# Patient Record
Sex: FEMALE | Race: BLACK OR AFRICAN AMERICAN | Employment: FULL TIME | URBAN - METROPOLITAN AREA
[De-identification: names, ages, dates, MRNs, and addresses within clinical notes are randomized per-mention and may not be internally consistent; named-entity substitution may affect disease eponyms.]

---

## 2017-12-01 PROBLEM — R06.00 DYSPNEA: Status: ACTIVE | Noted: 2017-12-01

## 2017-12-01 PROBLEM — I51.7 CARDIOMEGALY: Status: ACTIVE | Noted: 2017-12-01

## 2017-12-03 PROBLEM — I50.9 CHF (CONGESTIVE HEART FAILURE) (HCC): Status: ACTIVE | Noted: 2017-12-03

## 2017-12-03 PROBLEM — J18.9 PNEUMONIA: Status: ACTIVE | Noted: 2017-12-03

## 2018-01-08 ENCOUNTER — OFFICE VISIT (OUTPATIENT)
Dept: FAMILY MEDICINE CLINIC | Age: 58
End: 2018-01-08

## 2018-01-08 VITALS
OXYGEN SATURATION: 98 % | SYSTOLIC BLOOD PRESSURE: 138 MMHG | BODY MASS INDEX: 43.95 KG/M2 | RESPIRATION RATE: 14 BRPM | WEIGHT: 290 LBS | TEMPERATURE: 97.9 F | HEIGHT: 68 IN | DIASTOLIC BLOOD PRESSURE: 78 MMHG | HEART RATE: 68 BPM

## 2018-01-08 DIAGNOSIS — E66.01 OBESITY, MORBID (HCC): ICD-10-CM

## 2018-01-08 DIAGNOSIS — Z91.199 MEDICAL NON-COMPLIANCE: ICD-10-CM

## 2018-01-08 DIAGNOSIS — R79.89 ELEVATED SERUM CREATININE: ICD-10-CM

## 2018-01-08 DIAGNOSIS — J18.9 PNEUMONIA OF LEFT LOWER LOBE DUE TO INFECTIOUS ORGANISM: ICD-10-CM

## 2018-01-08 DIAGNOSIS — I50.40 COMBINED SYSTOLIC AND DIASTOLIC CONGESTIVE HEART FAILURE, UNSPECIFIED CONGESTIVE HEART FAILURE CHRONICITY: Primary | ICD-10-CM

## 2018-01-08 NOTE — PROGRESS NOTES
Patient is here to establish care with new pcp. .1. Have you been to the ER, urgent care clinic since your last visit? Hospitalized since your last visit? yes  2. Have you seen or consulted any other health care providers outside of the [place] [address] since your last visit? Include any pap smears or colon screening.  no

## 2018-01-09 NOTE — PROGRESS NOTES
HISTORY OF PRESENT ILLNESS  Poonam Ruiz is a 65 y.o. female.   HPI Comments:     Establish Care     CHF         ROS    Physical Exam    ASSESSMENT and PLAN  {ASSESSMENT/PLAN:23714}

## 2018-01-09 NOTE — PROGRESS NOTES
HISTORY OF PRESENT ILLNESS  Ivania Herrera is a 65 y.o. female. HPI Comments: Patient is here to follow up after she has recently  Been discharged from [place] [address] on 12/1/17 to 12/7/17. She was very short of breathe to the point where walking a few steps would make her short of breathe and was admitted with the following diagnosis:  CHF  Pneumonia  Dyspnea   Cardiomegaly   Patient is morbidly obese and thought that her symptoms are due to this and she mentions prior to this hospitalization she never has had any medical issues but 2 years back he was short of breathe     I have reviewed hospital record and patient was evaluated by cardiology in the hospital.  She was found to have the following :  1. Acute decompensated systolic and diastolic heart failure - NYHA III-IV, stage C       - Index admission for new CM, etiology unclear, but likely due to longstanding hypertensive HD       - EF10%  2. 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 3. Possible LLL pneumonia on CXR - no clinical symptoms of PNA otherwise  4. Pericardial effusion - small to moderate, no tamponade physiolgoy  5. HTN, uncontrolled, pt denies prior formal dx of HTN but has been noted to be elevated in the past  6. Morbid obesity Body mass index is 43.68 kg/(m^2).   7. Suspected KELSEY   8. Borderline DM  Patient was to see cardiology in office after 2 weeks but she mentions that this was too soon for her and was worried about taking off from work. Patient has refused in hospital cath and wanted to carry out her treatment as outpatient. She has brought in [facility] paperwork and mentions once her paper work is complete she will make an apt to see cardiology. I have explained the urgency and importance of this and she mentions she is taking her medications. I will also order a chest x-ray as she was diagnosed with left lower lobe pneumonia and has finished her Treatment course.   Also her renal function was elevated with a high creatinine of 2 I will order blood work  For this. Establish Care   The history is provided by the patient. This is a new problem. The current episode started more than 1 week ago. The problem occurs constantly. The problem has been gradually improving. Associated symptoms include shortness of breath. Pertinent negatives include no chest pain, no abdominal pain and no headaches. The symptoms are aggravated by stress, walking and standing. The symptoms are relieved by medications. Treatments tried: curretnly on meds. CHF   The history is provided by the patient. This is a chronic problem. The problem occurs constantly. The problem has been gradually worsening. Associated symptoms include shortness of breath. Pertinent negatives include no chest pain, no abdominal pain and no headaches. Nothing aggravates the symptoms. Nothing relieves the symptoms. She has tried nothing for the symptoms. Review of Systems   Constitutional: Positive for malaise/fatigue. Negative for fever and weight loss. HENT: Negative for congestion, ear pain, hearing loss, sinus pain and sore throat. Eyes: Negative for blurred vision, pain and discharge. Respiratory: Positive for cough and shortness of breath. Negative for sputum production and wheezing. Cardiovascular: Negative for chest pain, palpitations and leg swelling. Gastrointestinal: Negative for abdominal pain, nausea and vomiting. Genitourinary: Negative for dysuria. Musculoskeletal: Negative for joint pain and myalgias. Neurological: Negative for focal weakness, weakness and headaches. Endo/Heme/Allergies: Negative for environmental allergies. Psychiatric/Behavioral: The patient is not nervous/anxious. Visit Vitals    /78    Pulse 68    Temp 97.9 °F (36.6 °C) (Oral)    Resp 14    Ht 5' 8\" (1.727 m)    Wt 290 lb (131.5 kg)    SpO2 98%    BMI 44.09 kg/m2       Physical Exam   Constitutional: She is oriented to person, place, and time. She appears well-developed and well-nourished. No distress. HENT:   Head: Normocephalic and atraumatic. Right Ear: External ear normal.   Left Ear: External ear normal.   Mouth/Throat: Oropharynx is clear and moist. No oropharyngeal exudate. Eyes: EOM are normal. Pupils are equal, round, and reactive to light. No scleral icterus. Neck: Normal range of motion. No thyromegaly present. Cardiovascular: Normal rate, regular rhythm and normal heart sounds. Pulmonary/Chest: Effort normal and breath sounds normal. No respiratory distress. She has no wheezes. Abdominal: Soft. Bowel sounds are normal. She exhibits no distension. There is no tenderness. Lymphadenopathy:     She has no cervical adenopathy. Neurological: She is alert and oriented to person, place, and time. Psychiatric: She has a normal mood and affect.        ASSESSMENT and PLAN  CHF with ef of 10 percent:  - Please make an appointment with cardiology   - Blood work today   - FMLA form filled out    Pneumonia :  - Repeat chest x-ray     Elevated creatinine:  - Repeat blood work   Discussed importance of compliance , please continue current medications

## 2018-01-09 NOTE — PROGRESS NOTES
HISTORY OF PRESENT ILLNESS  Jose Louis is a 65 y.o. female.   Establish Care     CHF         ROS    Physical Exam    ASSESSMENT and PLAN  {ASSESSMENT/PLAN:25564}

## 2018-01-19 ENCOUNTER — DOCUMENTATION ONLY (OUTPATIENT)
Dept: FAMILY MEDICINE CLINIC | Age: 58
End: 2018-01-19

## 2020-01-26 PROBLEM — I50.43 ACUTE ON CHRONIC COMBINED SYSTOLIC AND DIASTOLIC CONGESTIVE HEART FAILURE (HCC): Status: ACTIVE | Noted: 2017-12-03

## 2021-02-22 PROBLEM — J96.02 ACUTE RESPIRATORY FAILURE WITH HYPOXIA AND HYPERCAPNIA (HCC): Status: ACTIVE | Noted: 2021-02-22

## 2021-02-22 PROBLEM — J96.01 ACUTE RESPIRATORY FAILURE WITH HYPOXIA AND HYPERCAPNIA (HCC): Status: ACTIVE | Noted: 2021-02-22

## 2021-09-23 PROBLEM — I50.9 CHF EXACERBATION (HCC): Status: ACTIVE | Noted: 2021-09-23

## 2021-09-23 PROBLEM — L03.116 CELLULITIS OF LEFT LEG: Status: ACTIVE | Noted: 2021-09-23

## 2022-01-21 PROBLEM — I50.9 ACUTE EXACERBATION OF CONGESTIVE HEART FAILURE (HCC): Status: ACTIVE | Noted: 2022-01-21

## 2022-01-21 PROBLEM — R07.2 PRECORDIAL CHEST PAIN: Status: ACTIVE | Noted: 2022-01-21

## 2022-01-21 PROBLEM — I42.9 CARDIOMYOPATHY (HCC): Status: ACTIVE | Noted: 2022-01-21

## 2022-01-21 PROBLEM — I31.39 PERICARDIAL EFFUSION: Status: ACTIVE | Noted: 2022-01-21

## 2022-01-21 PROBLEM — R77.8 ELEVATED TROPONIN LEVEL: Status: ACTIVE | Noted: 2022-01-21

## 2022-01-21 PROBLEM — J96.01 ACUTE HYPOXEMIC RESPIRATORY FAILURE (HCC): Status: ACTIVE | Noted: 2022-01-21

## 2022-02-17 PROBLEM — I49.01 VENTRICULAR FIBRILLATION (HCC): Status: ACTIVE | Noted: 2022-02-17

## 2022-02-17 PROBLEM — R07.2 PRECORDIAL CHEST PAIN: Status: RESOLVED | Noted: 2022-01-21 | Resolved: 2022-02-17

## 2022-02-17 PROBLEM — S31.819A BUTTOCK WOUND, RIGHT, INITIAL ENCOUNTER: Status: ACTIVE | Noted: 2022-02-17

## 2022-02-17 PROBLEM — J96.01 ACUTE HYPOXEMIC RESPIRATORY FAILURE (HCC): Status: RESOLVED | Noted: 2022-01-21 | Resolved: 2022-02-17

## 2022-02-17 PROBLEM — I13.10 CARDIORENAL SYNDROME WITH RENAL FAILURE: Status: ACTIVE | Noted: 2022-02-17

## 2022-03-18 PROBLEM — I50.43 ACUTE ON CHRONIC COMBINED SYSTOLIC AND DIASTOLIC CONGESTIVE HEART FAILURE (HCC): Status: ACTIVE | Noted: 2017-12-03

## 2022-03-18 PROBLEM — J18.9 PNEUMONIA: Status: ACTIVE | Noted: 2017-12-03

## 2022-03-18 PROBLEM — E66.01 OBESITY, MORBID (HCC): Status: ACTIVE | Noted: 2018-01-08

## 2022-03-18 PROBLEM — I49.01 VENTRICULAR FIBRILLATION (HCC): Status: ACTIVE | Noted: 2022-02-17

## 2022-03-18 PROBLEM — I50.9 CHF EXACERBATION (HCC): Status: ACTIVE | Noted: 2021-09-23

## 2022-03-19 PROBLEM — I42.9 CARDIOMYOPATHY (HCC): Status: ACTIVE | Noted: 2022-01-21

## 2022-03-19 PROBLEM — I13.10 CARDIORENAL SYNDROME WITH RENAL FAILURE: Status: ACTIVE | Noted: 2022-02-17

## 2022-03-19 PROBLEM — R06.00 DYSPNEA: Status: ACTIVE | Noted: 2017-12-01

## 2022-03-19 PROBLEM — J96.01 ACUTE RESPIRATORY FAILURE WITH HYPOXIA AND HYPERCAPNIA (HCC): Status: ACTIVE | Noted: 2021-02-22

## 2022-03-19 PROBLEM — J96.02 ACUTE RESPIRATORY FAILURE WITH HYPOXIA AND HYPERCAPNIA (HCC): Status: ACTIVE | Noted: 2021-02-22

## 2022-03-20 PROBLEM — I51.7 CARDIOMEGALY: Status: ACTIVE | Noted: 2017-12-01

## 2022-03-20 PROBLEM — I31.39 PERICARDIAL EFFUSION: Status: ACTIVE | Noted: 2022-01-21

## 2022-03-20 PROBLEM — L03.116 CELLULITIS OF LEFT LEG: Status: ACTIVE | Noted: 2021-09-23

## 2022-03-20 PROBLEM — S31.819A BUTTOCK WOUND, RIGHT, INITIAL ENCOUNTER: Status: ACTIVE | Noted: 2022-02-17

## 2022-03-20 PROBLEM — I50.9 ACUTE EXACERBATION OF CONGESTIVE HEART FAILURE (HCC): Status: ACTIVE | Noted: 2022-01-21

## 2022-03-20 PROBLEM — R77.8 ELEVATED TROPONIN LEVEL: Status: ACTIVE | Noted: 2022-01-21

## 2023-01-31 RX ORDER — ASPIRIN 81 MG/1
81 TABLET, CHEWABLE ORAL DAILY
COMMUNITY
Start: 2022-02-17

## 2023-01-31 RX ORDER — ATORVASTATIN CALCIUM 40 MG/1
40 TABLET, FILM COATED ORAL
COMMUNITY
Start: 2022-02-17

## 2023-01-31 RX ORDER — CLOPIDOGREL BISULFATE 75 MG/1
75 TABLET ORAL DAILY
COMMUNITY
Start: 2022-02-17

## 2023-01-31 RX ORDER — SODIUM HYPOCHLORITE 1.25 MG/ML
SOLUTION TOPICAL
COMMUNITY
Start: 2022-02-17